# Patient Record
Sex: MALE | HISPANIC OR LATINO | URBAN - METROPOLITAN AREA
[De-identification: names, ages, dates, MRNs, and addresses within clinical notes are randomized per-mention and may not be internally consistent; named-entity substitution may affect disease eponyms.]

---

## 2024-03-24 ENCOUNTER — ANCILLARY PROCEDURE (OUTPATIENT)
Dept: GENERAL RADIOLOGY | Facility: CLINIC | Age: 34
End: 2024-03-24
Attending: PHYSICIAN ASSISTANT

## 2024-03-24 ENCOUNTER — OFFICE VISIT (OUTPATIENT)
Dept: URGENT CARE | Facility: URGENT CARE | Age: 34
End: 2024-03-24

## 2024-03-24 VITALS
DIASTOLIC BLOOD PRESSURE: 95 MMHG | HEART RATE: 122 BPM | TEMPERATURE: 100.1 F | WEIGHT: 222 LBS | RESPIRATION RATE: 20 BRPM | OXYGEN SATURATION: 97 % | SYSTOLIC BLOOD PRESSURE: 150 MMHG

## 2024-03-24 DIAGNOSIS — R50.9 FEVER AND CHILLS: ICD-10-CM

## 2024-03-24 DIAGNOSIS — R06.02 SHORTNESS OF BREATH: ICD-10-CM

## 2024-03-24 DIAGNOSIS — B34.9 VIRAL SYNDROME: Primary | ICD-10-CM

## 2024-03-24 LAB
DEPRECATED S PYO AG THROAT QL EIA: NEGATIVE
FLUAV AG SPEC QL IA: NEGATIVE
FLUBV AG SPEC QL IA: NEGATIVE

## 2024-03-24 PROCEDURE — 71046 X-RAY EXAM CHEST 2 VIEWS: CPT | Mod: TC | Performed by: RADIOLOGY

## 2024-03-24 PROCEDURE — 99204 OFFICE O/P NEW MOD 45 MIN: CPT | Performed by: PHYSICIAN ASSISTANT

## 2024-03-24 PROCEDURE — 87651 STREP A DNA AMP PROBE: CPT | Performed by: PHYSICIAN ASSISTANT

## 2024-03-24 PROCEDURE — 87804 INFLUENZA ASSAY W/OPTIC: CPT | Performed by: PHYSICIAN ASSISTANT

## 2024-03-24 PROCEDURE — 87635 SARS-COV-2 COVID-19 AMP PRB: CPT | Performed by: PHYSICIAN ASSISTANT

## 2024-03-24 RX ORDER — ALBUTEROL SULFATE 90 UG/1
2 AEROSOL, METERED RESPIRATORY (INHALATION) EVERY 6 HOURS PRN
Qty: 18 G | Refills: 0 | Status: SHIPPED | OUTPATIENT
Start: 2024-03-24

## 2024-03-24 RX ORDER — IBUPROFEN 600 MG/1
600 TABLET, FILM COATED ORAL EVERY 6 HOURS PRN
Qty: 40 TABLET | Refills: 0 | Status: SHIPPED | OUTPATIENT
Start: 2024-03-24

## 2024-03-24 NOTE — PROGRESS NOTES
Patient presents with:  Shortness of Breath: X1 day, said he had a marijuana edible last night without his knowledge wondering if that might cause his SOB    (B34.9) Viral syndrome  (primary encounter diagnosis)  Comment:   Plan: ibuprofen (ADVIL/MOTRIN) 600 MG tablet        rest    (R50.9) Fever and chills  Comment:   Plan: Influenza A/B antigen, Symptomatic COVID-19         Virus (Coronavirus) by PCR Nose, XR Chest 2         Views, Streptococcus A Rapid Screen w/Reflex to        PCR - Clinic Collect, Group A Streptococcus PCR        Throat Swab            (R06.02) Shortness of breath  Comment:   Plan: XR Chest 2 Views, albuterol (PROAIR         HFA/PROVENTIL HFA/VENTOLIN HFA) 108 (90 Base)         MCG/ACT inhaler            Chest xray is normal.      Covid test is pending, we will contact you if positiv.      Rapid strep is negative, culture is growing and we will call you if positive.      Influenza is negative      At the end of the encounter, I discussed results, diagnosis, medications. Discussed red flags for immediate return to clinic/ER, as well as indications for follow up if no improvement. Patient understood and agreed to plan. Patient was stable for discharge   If not improving or if condition worsens, follow up with your Primary Care Provider    45 minutes spent by me on the date of the encounter doing chart review, review of test results, interpretation of tests, patient visit, documentation, discussion with family, and communication via English telephone         SUBJECTIVE:   Zane Quintero is a 33 year old male who presents today with feeling short of breath since he awoke this morning.  He is febrile in clinic.  He denies any cough.  He denies any chest pain or chest pressure.  Denies any nausea.  He is here today with his significant other.  English telephone  is used for communication.    Of note is that he, unknown to him, ingested a cannabis gummy last night.   Denies any other symptoms.      There is no problem list on file for this patient.        No past medical history on file.      Current Outpatient Medications   Medication Sig Dispense Refill    Multiple Vitamins-Iron (DAILY-JAYDON/IRON/BETA-CAROTENE) TABS TAKE 1 TABLET BY MOUTH DAILY. (Patient not taking: Reported on 10/19/2020) 30 tablet 7     Social History     Tobacco Use    Smoking status: Never Smoker    Smokeless tobacco: Never Used   Substance Use Topics    Alcohol use: Not on file     Family History   Problem Relation Age of Onset    Diabetes Mother     Diabetes Father          ROS:    10 point ROS of systems including Constitutional, Eyes, Respiratory, Cardiovascular, Gastroenterology, Genitourinary, Integumentary, Muscularskeletal, Psychiatric ,neurological were all negative except for pertinent positives noted in my HPI       OBJECTIVE:  BP (!) 150/95 (BP Location: Right arm, Patient Position: Sitting, Cuff Size: Adult Large)   Pulse (!) 122   Temp 100.1  F (37.8  C) (Oral)   Resp 20   Wt 100.7 kg (222 lb)   SpO2 97%   Physical Exam:  GENERAL APPEARANCE: healthy, alert and no distress  EYES: EOMI,  PERRL, conjunctiva clear  HENT: ear canals and TM's normal.  Nose and mouth without ulcers, erythema or lesions  NECK: supple, nontender, no lymphadenopathy  RESP: lungs clear to auscultation - no rales, rhonchi or wheezes  CV: regular rates and rhythm, normal S1 S2, no murmur noted  ABDOMEN:  soft, nontender, no HSM or masses and bowel sounds normal  NEURO: Normal strength and tone, sensory exam grossly normal,  normal speech and mentation  SKIN: no suspicious lesions or rashes    X-Ray was done, my findings are: No infiltrates masses or pneumothorax.    Results for orders placed or performed in visit on 03/24/24   XR Chest 2 Views     Status: None    Narrative    EXAM: XR CHEST 2 VIEWS  LOCATION: [facility]  DATE: 3/24/2024    INDICATION: Shortness of breath. Fever.  COMPARISON:  None.      Impression    IMPRESSION: Negative chest.   Influenza A/B antigen     Status: Normal    Specimen: Nose; Swab   Result Value Ref Range    Influenza A antigen Negative Negative    Influenza B antigen Negative Negative    Narrative    Test results must be correlated with clinical data. If necessary, results should be confirmed by a molecular assay or viral culture.   Streptococcus A Rapid Screen w/Reflex to PCR - Clinic Collect     Status: Normal    Specimen: Throat; Swab   Result Value Ref Range    Group A Strep antigen Negative Negative

## 2024-03-24 NOTE — LETTER
March 24, 2024      Zane Quintero  [address] [facility] [address]        To Whom It May Concern:    Zane Quintero was seen in our clinic for a febrile illness.  He may return to work when he has been fever free for 24 hours off of fever reducing medication.      Sincerely,          Namita JACOBS, PABLO

## 2024-03-25 LAB
GROUP A STREP BY PCR: NOT DETECTED
SARS-COV-2 RNA RESP QL NAA+PROBE: NEGATIVE

## 2024-03-25 NOTE — PATIENT INSTRUCTIONS
(B34.9) Viral syndrome  (primary encounter diagnosis)  Comment:   Plan: ibuprofen (ADVIL/MOTRIN) 600 MG tablet        rest    (R50.9) Fever and chills  Comment:   Plan: Influenza A/B antigen, Symptomatic COVID-19         Virus (Coronavirus) by PCR Nose, XR Chest 2         Views, Streptococcus A Rapid Screen w/Reflex to        PCR - Clinic Collect, Group A Streptococcus PCR        Throat Swab            (R06.02) Shortness of breath  Comment:   Plan: XR Chest 2 Views, albuterol (PROAIR         HFA/PROVENTIL HFA/VENTOLIN HFA) 108 (90 Base)         MCG/ACT inhaler            Chest xray is normal.      Covid test is pending, we will contact you if positiv.      Rapid strep is negative, culture is growing and we will call you if positive.      Influenza is negative